# Patient Record
Sex: MALE | Race: WHITE
[De-identification: names, ages, dates, MRNs, and addresses within clinical notes are randomized per-mention and may not be internally consistent; named-entity substitution may affect disease eponyms.]

---

## 2019-04-11 ENCOUNTER — HOSPITAL ENCOUNTER (OUTPATIENT)
Dept: HOSPITAL 88 - OR | Age: 19
Discharge: HOME | End: 2019-04-11
Attending: ORTHOPAEDIC SURGERY
Payer: COMMERCIAL

## 2019-04-11 VITALS — SYSTOLIC BLOOD PRESSURE: 129 MMHG | DIASTOLIC BLOOD PRESSURE: 83 MMHG

## 2019-04-11 DIAGNOSIS — S52.352A: Primary | ICD-10-CM

## 2019-04-11 DIAGNOSIS — Z88.1: ICD-10-CM
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
## 2019-04-11 PROCEDURE — 25515 OPTX RADIAL SHAFT FRACTURE: CPT

## 2019-04-11 NOTE — XMS REPORT
Patient Summary Document

                             Created on: 2019



ORION RAHUL ISSAC

External Reference #: [id number]

: 2000

Sex: Male



Demographics







                          Address                   [address] [place], [place] [address]

 

                          Home Phone                [phone]

 

                          Preferred Language        Unknown

 

                          Marital Status            Unknown

 

                          Gnosticism Affiliation     Unknown

 

                          Race                      Unknown

 

                                        Additional Race(s)  

 

                          Ethnic Group              Unknown





Author







                          Author                    [facility]

 

                          Address                   Unknown

 

                          Phone                     Unavailable







Care Team Providers







                    Care Team Member Name    Role                Phone

 

                    BEATA AYALA    Unavailable         Unavailable







Problems

This patient has no known problems.



Allergies, Adverse Reactions, Alerts

This patient has no known allergies or adverse reactions.



Medications

This patient has no known medications.



Results







           Test Description    Test Time    Test Comments    Text Results    Atomic Results    Result

 Comments

 

                FOREARM LEFT 2 VIEW    2019 23:29:00                                                       

                                                   [place] [address]      Patient Name: RAHUL SEARS                               
   MR #: [id number]                     : 2000                         
         Age/Sex: 18/M  Acct #: [id number]                              Req #:
[id number]  Adm Physician:                                                      
Ordered by: TOBIAS AYALA MD                            Report #: [id number]  
     Location: ER                                      Room/Bed:                
    
___________________________________________________________________________________________________
   Procedure: [id number] DX/FOREARM LEFT 2 VIEW  Exam Date: 19             
              Exam Time:                                               
REPORT STATUS: Signed       Exam:  Left forearm 2 views      History:  Pain     
Comparison: None.      Findings: Transverse fracture of the mid diaphysis of the
radius with mild   displacement. Joint spaces are intact.      Impression:      
Transverse fracture of the mid diaphysis of the radius with mild displacement.  
       Signed by: Dr. Andrew Palisch, M.D. on 2019 11:29 PM        Dictated 
By: ANDREW R PALISCH MD  Electronically Signed By: ANDREW R PALISCH MD on  8608  Transcribed By: TYRA on 19 5169       COPY TO:   
TOBIAS AYALA MD

## 2019-04-11 NOTE — NUR
OPERATIVE NOTE  ORTHOPEDICS

.

PREOPERATIVE DIAGNOSIS:  LEFT Comminuted Radial Shaft Fracture

PREOPERATIVE DIAGNOSIS: LEFT Comminuted Radial Shaft Fracture 

OPERATIONS PERFORMED: Open reduction and internal fixation of LEFT Comminuted 
Radial Shaft Fracture with Bone Graft and Flouroscopic Interpretation 

SURGEON: Brittnee Bush DO

ASSISTANT: None

ANESTHESIA: General

EBL: 20 cc

COMPLICATIONS: None.

COMPONENTS:



CLINICAL SUMMARY: The patient is a 18-year-old right-hand dominant male who 
sustained a mechanical fall resulting in a fracture to the left Radial Shaft.

OPERATION: The patient was brought to the operating table and placed in supine 
position and administered general anesthetic by the anesthesia. Preoperative 
antibiotics were provided. Once adequate anesthesia had been obtained, the 
left upper extremity was prepped and draped in the usual sterile manner. The 
patient received preoperative antibiotics. Tourniquet was placed around the 
left upper extremity. The upper extremity was then elevated and exsanguinated 
using an Esmarch dressing. The tourniquet was elevated to 250 mmHg. The entire 
operation was performed with loop magnification. At this time an approximately 
8 cm longitudinal incision was then made overlying the right flexor carpi 
radialis tendon centralized over the fracture site which was confirmed via 
flouroscopy. This was carried down vial the volar zara approach between the 
flexor carpi radialis and brachioradialis. Via careful dissection with careful 
hemostasis and caution for the neurovascular bundle, the fracture site was 
visualized. The fracture sites were cleaned and approximated and reduced. 
Reduction was confirmed under direct and flourscopic interpretation. There was 
a ulnar sided butterfly fragment that was comminuted. Under image control, the 
two larger fragments carefully manipulated and reduced after a thorough 
irrigation and debridement. Due to the bone void ulnarly, DBM allograft were 
used to help fill the ulnar void after the comminuted piece were reduced. A 
Laporte 7 hole Narrow Compression Plate with 4 3.5 Non-locking screws, 3 3.5 
Locking screws was then fashioned over and placed into the radius. Trajectory 
and length of the screws were determined to be adequate. The comminution on 
the ulnar side contained too many small pieces to place hardware to maintain 
fixation.   Under flouroscopic interpretation, the components and reduction 
was confirmed to be in adequate position without causing any limitations in 
motion. . Images were obtained and interpreted by me demonstrating adequate 
reduction of the fragments and the fracture with hardware in appropriate 
alignment.  The incision was thoroughly irrigated and homeostasis was 
maintained with electrocautery. 

 

The subcutaneous tissue was closed with a 2-0 vicryl and the skin was closed 
with a 3-0 monocryl.  The skin was cleaned and steristrips with xeroform were pl
aced over the incision.   Steristrips, Xeroform, 4x4, Webril were used and a 
sugar tong splint over the arm.  The patient was then placed in a sling. The 
tourniquet was let down at 90 minutes. The fingers were immediately pink. The 
patient was awakened and taken to the recovery room in good condition. There 
were no operative complications. The patient tolerated the procedure well.

## 2019-04-11 NOTE — NUR
DISCHARGE SUMMARY



PRIMARY DIAGNOSIS: Left Radial Shaft Fracture



PROCEDURE PERFORMED:  Left Radial Shaft Fracture with Bone Graft



HOSPITAL COURSE: Patient tolerated procedure without complication.  Discharged 
home after fulfilling ASU criteria



INSTRUCTIONS ON DISCHARGE:

Analgesics: Script provided

Dressing Management: Leave on until follow up appointment

Follow up in the office in 2 weeks.



MEDICATIONS ON DISCHARGE: Scripts provided





[facility]

[address] [place], [place] [address] [phone]